# Patient Record
Sex: MALE | Race: WHITE
[De-identification: names, ages, dates, MRNs, and addresses within clinical notes are randomized per-mention and may not be internally consistent; named-entity substitution may affect disease eponyms.]

---

## 2018-07-09 ENCOUNTER — HOSPITAL ENCOUNTER (OUTPATIENT)
Dept: HOSPITAL 92 - LABBT | Age: 68
Discharge: HOME | End: 2018-07-09
Attending: NEUROLOGICAL SURGERY
Payer: MEDICARE

## 2018-07-09 DIAGNOSIS — Z01.818: Primary | ICD-10-CM

## 2018-07-09 DIAGNOSIS — M54.12: ICD-10-CM

## 2018-07-09 LAB
ANION GAP SERPL CALC-SCNC: 15 MMOL/L (ref 10–20)
BUN SERPL-MCNC: 10 MG/DL (ref 8.4–25.7)
CALCIUM SERPL-MCNC: 9.7 MG/DL (ref 7.8–10.44)
CHLORIDE SERPL-SCNC: 103 MMOL/L (ref 98–107)
CO2 SERPL-SCNC: 27 MMOL/L (ref 23–31)
CREAT CL PREDICTED SERPL C-G-VRATE: 0 ML/MIN (ref 70–130)
GLUCOSE SERPL-MCNC: 96 MG/DL (ref 80–115)
HGB BLD-MCNC: 13.9 G/DL (ref 14–18)
MCH RBC QN AUTO: 34.1 PG (ref 27–31)
MCV RBC AUTO: 99 FL (ref 78–98)
PLATELET # BLD AUTO: 129 THOU/UL (ref 130–400)
POTASSIUM SERPL-SCNC: 4.2 MMOL/L (ref 3.5–5.1)
RBC # BLD AUTO: 4.08 MILL/UL (ref 4.7–6.1)
SODIUM SERPL-SCNC: 141 MMOL/L (ref 136–145)
WBC # BLD AUTO: 5.5 THOU/UL (ref 4.8–10.8)

## 2018-07-09 PROCEDURE — 80048 BASIC METABOLIC PNL TOTAL CA: CPT

## 2018-07-09 PROCEDURE — 93010 ELECTROCARDIOGRAM REPORT: CPT

## 2018-07-09 PROCEDURE — 85027 COMPLETE CBC AUTOMATED: CPT

## 2018-07-09 PROCEDURE — 93005 ELECTROCARDIOGRAM TRACING: CPT

## 2018-07-10 NOTE — EKG
Test Reason : 

Blood Pressure : ***/*** mmHG

Vent. Rate : 069 BPM     Atrial Rate : 069 BPM

   P-R Int : 168 ms          QRS Dur : 082 ms

    QT Int : 396 ms       P-R-T Axes : 067 025 030 degrees

   QTc Int : 424 ms

 

Normal sinus rhythm

RSR' or QR pattern in V1 suggests right ventricular conduction delay

Borderline ECG

 

Confirmed by DUNCAN CAO ([id number]) on 7/10/2018 12:45:44 PM

 

Referred By:  ISABELLE           Confirmed By:DUNCAN CAO

## 2018-07-18 ENCOUNTER — HOSPITAL ENCOUNTER (INPATIENT)
Dept: HOSPITAL 92 - SDC | Age: 68
LOS: 1 days | Discharge: HOME | DRG: 473 | End: 2018-07-19
Attending: NEUROLOGICAL SURGERY | Admitting: NEUROLOGICAL SURGERY
Payer: MEDICARE

## 2018-07-18 VITALS — BODY MASS INDEX: 26.8 KG/M2

## 2018-07-18 DIAGNOSIS — M50.321: Primary | ICD-10-CM

## 2018-07-18 DIAGNOSIS — D53.9: ICD-10-CM

## 2018-07-18 DIAGNOSIS — Z79.899: ICD-10-CM

## 2018-07-18 DIAGNOSIS — I12.9: ICD-10-CM

## 2018-07-18 DIAGNOSIS — E78.5: ICD-10-CM

## 2018-07-18 DIAGNOSIS — M25.78: ICD-10-CM

## 2018-07-18 DIAGNOSIS — N18.2: ICD-10-CM

## 2018-07-18 PROCEDURE — 0RG20A0 FUSION OF 2 OR MORE CERVICAL VERTEBRAL JOINTS WITH INTERBODY FUSION DEVICE, ANTERIOR APPROACH, ANTERIOR COLUMN, OPEN APPROACH: ICD-10-PCS | Performed by: NEUROLOGICAL SURGERY

## 2018-07-18 PROCEDURE — C1713 ANCHOR/SCREW BN/BN,TIS/BN: HCPCS

## 2018-07-18 PROCEDURE — 76001: CPT

## 2018-07-18 PROCEDURE — 0RT30ZZ RESECTION OF CERVICAL VERTEBRAL DISC, OPEN APPROACH: ICD-10-PCS | Performed by: NEUROLOGICAL SURGERY

## 2018-07-18 PROCEDURE — C1776 JOINT DEVICE (IMPLANTABLE): HCPCS

## 2018-07-18 PROCEDURE — A4216 STERILE WATER/SALINE, 10 ML: HCPCS

## 2018-07-18 RX ADMIN — ACETAMINOPHEN AND CODEINE PHOSPHATE PRN TAB: 300; 30 TABLET ORAL at 12:36

## 2018-07-18 RX ADMIN — ACETAMINOPHEN AND CODEINE PHOSPHATE PRN TAB: 300; 30 TABLET ORAL at 19:13

## 2018-07-18 RX ADMIN — Medication SCH GM: at 14:43

## 2018-07-18 RX ADMIN — ACETAMINOPHEN AND CODEINE PHOSPHATE PRN TAB: 300; 30 TABLET ORAL at 22:02

## 2018-07-18 RX ADMIN — Medication SCH GM: at 20:01

## 2018-07-18 NOTE — OP
DATE OF PROCEDURE:  07/18/2018

 

SURGEON:  Manny Granger M.D.

 

ASSISTANT:  Douglas Jones PA-C

 

PROCEDURE:  Anterior cervical discectomy C4-5 and C5-6, interbody arthrodesis, intravertebral biomech
anical device, local morselized autograft, demineralized bone matrix, anterior titanium instrumentati
on C4-C6.

 

PROCEDURE IN DETAIL:  The patient was brought to the operating room and intubated.  He was positioned
 supine in modest extension on a gel-filled donut.  Incision was made in the right precervical area a
nd dissecting medial to the sternocleidomastoid muscle, identified the anterior cervical spine and ou
r level was confirmed by x-ray.  As anticipated, there was severe and dramatic osteophytes at C4-5 an
d C5-6.  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  We completely debrided and removed osteophytes at b
oth levels.  We placed distraction across C4 through C6 and C4-5 was somewhat mobile, but C5-6 was no
t.  Ultimately, because I could not identify any mobile or viable disk space at C5-6, any efforts at 
further discectomy was aborted here.  At C4-5 the disk was completely removed and the neural elements
 were decompressed.  The bony endplates were decorticated for the purpose of arthrodesis and appropri
ately sized intravertebral biomechanical PEEK device was brought into the field, filled with deminera
lized bone matrix, local morselized autograft, and tapped into place securely at C4-5.  Next, an ante
rior plate was brought in the field and secured to C4, C5, and C6 using two 14 mm screws at each leve
l.  The wound was then extensively irrigated, immaculate hemostasis was secured, and the wound was cl
osed in anatomic layers over a drain.

## 2018-07-19 VITALS — SYSTOLIC BLOOD PRESSURE: 143 MMHG | TEMPERATURE: 98 F | DIASTOLIC BLOOD PRESSURE: 86 MMHG

## 2018-07-19 RX ADMIN — ACETAMINOPHEN AND CODEINE PHOSPHATE PRN TAB: 300; 30 TABLET ORAL at 05:48

## 2018-07-19 RX ADMIN — Medication SCH GM: at 05:47

## 2018-07-19 RX ADMIN — ACETAMINOPHEN AND CODEINE PHOSPHATE PRN TAB: 300; 30 TABLET ORAL at 00:53

## 2018-07-19 RX ADMIN — ACETAMINOPHEN AND CODEINE PHOSPHATE PRN TAB: 300; 30 TABLET ORAL at 12:33

## 2018-08-01 ENCOUNTER — HOSPITAL ENCOUNTER (OUTPATIENT)
Dept: HOSPITAL 92 - TBSIIMAG | Age: 68
Discharge: HOME | End: 2018-08-01
Attending: NEUROLOGICAL SURGERY
Payer: MEDICARE

## 2018-08-01 DIAGNOSIS — M48.02: Primary | ICD-10-CM

## 2018-08-01 DIAGNOSIS — Z98.890: ICD-10-CM

## 2018-08-01 DIAGNOSIS — M47.892: ICD-10-CM

## 2018-08-01 PROCEDURE — 72040 X-RAY EXAM NECK SPINE 2-3 VW: CPT

## 2018-08-01 NOTE — RAD
CERVICAL SPINE RADIOGRAPH SERIES 3 VIEWS:

 

Date:  08/01/18 

 

CLINICAL HISTORY:  

Status post neck surgery, cervical stenosis of spine. 

 

FINDINGS:

ACDF hardware spans C4-C6. There is an intervening disc space prosthesis at C4-5. No hardware complic
ation is evident on the provided views. Multilevel moderate degenerative change present. Lateral mass
es of C1 appropriately align. 

 

IMPRESSION: 

Postoperative cervical spine with multilevel degenerative change. No obvious hardware complication. 

 

 

POS: [facility]

## 2018-09-20 ENCOUNTER — HOSPITAL ENCOUNTER (OUTPATIENT)
Dept: HOSPITAL 92 - TBSIIMAG | Age: 68
Discharge: HOME | End: 2018-09-20
Attending: NEUROLOGICAL SURGERY
Payer: MEDICARE

## 2018-09-20 DIAGNOSIS — M50.30: Primary | ICD-10-CM

## 2018-09-20 DIAGNOSIS — Z98.1: ICD-10-CM

## 2018-09-20 PROCEDURE — 72040 X-RAY EXAM NECK SPINE 2-3 VW: CPT

## 2018-09-20 NOTE — RAD
CERVICAL SPINE AP AND LATERAL STANDARD

9/20/18

 

HISTORY: 

Surgical followup.

 

COMPARISON:  

Radiograph 8/1/18.

 

FINDINGS:  

The ACDF hardware is present at C4-C6 with discectomy changes at C4-5. This is similar to the compari
son examination. No migration of the discectomy spacer. Open mouth odontoid view is normal. 

 

Moderate narrowing at C3-4 disc space as well as the C6-7 disc space. 

 

IMPRESSION:  

Unchanged satisfactory postoperative appearance. 

 

 

 

POS: [facility]

## 2020-03-31 ENCOUNTER — HOSPITAL ENCOUNTER (OUTPATIENT)
Dept: HOSPITAL 92 - BICRAD | Age: 70
Discharge: HOME | End: 2020-03-31
Attending: NEUROLOGICAL SURGERY
Payer: MEDICARE

## 2020-03-31 DIAGNOSIS — M47.812: ICD-10-CM

## 2020-03-31 DIAGNOSIS — M48.02: Primary | ICD-10-CM

## 2020-03-31 DIAGNOSIS — Z98.1: ICD-10-CM

## 2020-03-31 PROCEDURE — 72050 X-RAY EXAM NECK SPINE 4/5VWS: CPT

## 2020-03-31 NOTE — RAD
CERVICAL SPINE 4 VIEWS:

 

HISTORY: 

Cervical stenosis.  Neck pain.

 

FINDINGS: 

Previous anterior fusion procedures noted.  Anterior plate and screws transfix C4, C5, and C6.  Inter
body implant at C4-5 shows some anterior displacement.  There is loss of disk space at C5-6 and C6-7.
  There is a posterior listhesis at C4-5 and C5-6 with posterior spondylosis at these levels.  The po
sterior listhesis at these levels does not appear to significantly change with flexion or extension.

 

IMPRESSION: 

Degenerative and postoperative changes of cervical spine.

 

POS: [facility]

## 2020-04-25 ENCOUNTER — HOSPITAL ENCOUNTER (EMERGENCY)
Dept: HOSPITAL 92 - ERS | Age: 70
Discharge: HOME | End: 2020-04-25
Payer: MEDICARE

## 2020-04-25 DIAGNOSIS — I10: ICD-10-CM

## 2020-04-25 DIAGNOSIS — T63.481A: Primary | ICD-10-CM

## 2020-04-25 DIAGNOSIS — E78.5: ICD-10-CM

## 2020-04-25 DIAGNOSIS — E78.00: ICD-10-CM

## 2020-04-25 DIAGNOSIS — Z79.899: ICD-10-CM

## 2020-04-25 DIAGNOSIS — Z79.82: ICD-10-CM

## 2020-04-25 PROCEDURE — 99282 EMERGENCY DEPT VISIT SF MDM: CPT

## 2021-01-27 ENCOUNTER — HOSPITAL ENCOUNTER (EMERGENCY)
Dept: HOSPITAL 92 - ERS | Age: 71
Discharge: HOME | End: 2021-01-27
Payer: MEDICARE

## 2021-01-27 DIAGNOSIS — J12.82: ICD-10-CM

## 2021-01-27 DIAGNOSIS — U07.1: Primary | ICD-10-CM

## 2021-01-27 DIAGNOSIS — E78.00: ICD-10-CM

## 2021-01-27 DIAGNOSIS — E78.5: ICD-10-CM

## 2021-01-27 DIAGNOSIS — Z79.899: ICD-10-CM

## 2021-01-27 DIAGNOSIS — I10: ICD-10-CM

## 2021-01-27 DIAGNOSIS — Z79.82: ICD-10-CM

## 2021-01-27 LAB
ALBUMIN SERPL BCG-MCNC: 4.7 G/DL (ref 3.4–4.8)
ALP SERPL-CCNC: 114 U/L (ref 40–110)
ALT SERPL W P-5'-P-CCNC: 35 U/L (ref 8–55)
ANION GAP SERPL CALC-SCNC: 18 MMOL/L (ref 10–20)
AST SERPL-CCNC: 34 U/L (ref 5–34)
BASOPHILS # BLD AUTO: 0 THOU/UL (ref 0–0.2)
BASOPHILS NFR BLD AUTO: 0.2 % (ref 0–1)
BILIRUB SERPL-MCNC: 1.2 MG/DL (ref 0.2–1.2)
BUN SERPL-MCNC: 17 MG/DL (ref 8.4–25.7)
CALCIUM SERPL-MCNC: 9.1 MG/DL (ref 7.8–10.44)
CHLORIDE SERPL-SCNC: 99 MMOL/L (ref 98–107)
CO2 SERPL-SCNC: 25 MMOL/L (ref 23–31)
CREAT CL PREDICTED SERPL C-G-VRATE: 0 ML/MIN (ref 70–130)
EOSINOPHIL # BLD AUTO: 0 THOU/UL (ref 0–0.7)
EOSINOPHIL NFR BLD AUTO: 0.2 % (ref 0–10)
GLOBULIN SER CALC-MCNC: 3.2 G/DL (ref 2.4–3.5)
GLUCOSE SERPL-MCNC: 109 MG/DL (ref 80–115)
HGB BLD-MCNC: 15.6 G/DL (ref 14–18)
LYMPHOCYTES # BLD: 0.9 THOU/UL (ref 1.2–3.4)
LYMPHOCYTES NFR BLD AUTO: 12.8 % (ref 21–51)
MCH RBC QN AUTO: 30.7 PG (ref 27–31)
MCV RBC AUTO: 93 FL (ref 78–98)
MONOCYTES # BLD AUTO: 0.6 THOU/UL (ref 0.11–0.59)
MONOCYTES NFR BLD AUTO: 8.6 % (ref 0–10)
NEUTROPHILS # BLD AUTO: 5.6 THOU/UL (ref 1.4–6.5)
NEUTROPHILS NFR BLD AUTO: 78.1 % (ref 42–75)
PLATELET # BLD AUTO: 168 THOU/UL (ref 130–400)
POTASSIUM SERPL-SCNC: 3.7 MMOL/L (ref 3.5–5.1)
RBC # BLD AUTO: 5.07 MILL/UL (ref 4.7–6.1)
SARS-COV-2 RNA RESP QL NAA+PROBE: DETECTED
SODIUM SERPL-SCNC: 138 MMOL/L (ref 136–145)
WBC # BLD AUTO: 7.2 THOU/UL (ref 4.8–10.8)

## 2021-01-27 PROCEDURE — 71275 CT ANGIOGRAPHY CHEST: CPT

## 2021-01-27 PROCEDURE — 36415 COLL VENOUS BLD VENIPUNCTURE: CPT

## 2021-01-27 PROCEDURE — 93005 ELECTROCARDIOGRAM TRACING: CPT

## 2021-01-27 PROCEDURE — 85025 COMPLETE CBC W/AUTO DIFF WBC: CPT

## 2021-01-27 PROCEDURE — 71046 X-RAY EXAM CHEST 2 VIEWS: CPT

## 2021-01-27 PROCEDURE — 99285 EMERGENCY DEPT VISIT HI MDM: CPT

## 2021-01-27 PROCEDURE — 80053 COMPREHEN METABOLIC PANEL: CPT

## 2021-01-27 PROCEDURE — 96374 THER/PROPH/DIAG INJ IV PUSH: CPT

## 2021-01-27 PROCEDURE — 0240U: CPT

## 2021-01-27 PROCEDURE — 84484 ASSAY OF TROPONIN QUANT: CPT

## 2021-08-24 ENCOUNTER — HOSPITAL ENCOUNTER (OUTPATIENT)
Dept: HOSPITAL 92 - BICRAD | Age: 71
Discharge: HOME | End: 2021-08-24
Attending: INTERNAL MEDICINE
Payer: MEDICARE

## 2021-08-24 DIAGNOSIS — R06.00: Primary | ICD-10-CM

## 2021-08-24 PROCEDURE — 71046 X-RAY EXAM CHEST 2 VIEWS: CPT

## 2022-08-01 ENCOUNTER — HOSPITAL ENCOUNTER (OUTPATIENT)
Dept: HOSPITAL 92 - RAD | Age: 72
Discharge: HOME | End: 2022-08-01
Attending: OTOLARYNGOLOGY
Payer: MEDICARE

## 2022-08-01 DIAGNOSIS — R13.12: Primary | ICD-10-CM

## 2022-08-01 DIAGNOSIS — R63.30: ICD-10-CM

## 2022-08-01 PROCEDURE — 74230 X-RAY XM SWLNG FUNCJ C+: CPT

## 2022-08-30 ENCOUNTER — HOSPITAL ENCOUNTER (OUTPATIENT)
Dept: HOSPITAL 92 - SLEEPLAB | Age: 72
Discharge: HOME | End: 2022-08-30
Payer: MEDICARE

## 2022-08-30 DIAGNOSIS — I25.10: ICD-10-CM

## 2022-08-30 DIAGNOSIS — G47.00: ICD-10-CM

## 2022-08-30 DIAGNOSIS — R13.10: ICD-10-CM

## 2022-08-30 DIAGNOSIS — E66.9: ICD-10-CM

## 2022-08-30 DIAGNOSIS — R06.83: ICD-10-CM

## 2022-08-30 DIAGNOSIS — G47.33: Primary | ICD-10-CM

## 2022-08-30 DIAGNOSIS — R53.83: ICD-10-CM

## 2022-08-30 DIAGNOSIS — G47.10: ICD-10-CM

## 2022-08-30 DIAGNOSIS — I10: ICD-10-CM

## 2022-08-30 PROCEDURE — 95810 POLYSOM 6/> YRS 4/> PARAM: CPT

## 2022-11-17 ENCOUNTER — HOSPITAL ENCOUNTER (OUTPATIENT)
Dept: HOSPITAL 92 - CSHMRI | Age: 72
Discharge: HOME | End: 2022-11-17
Attending: UROLOGY
Payer: COMMERCIAL

## 2022-11-17 DIAGNOSIS — M87.9: ICD-10-CM

## 2022-11-17 DIAGNOSIS — C61: Primary | ICD-10-CM

## 2022-11-17 LAB — EGFRCR SERPLBLD CKD-EPI 2021: 92 ML/MIN/{1.73_M2}

## 2022-11-17 PROCEDURE — 82565 ASSAY OF CREATININE: CPT

## 2022-11-17 PROCEDURE — 72197 MRI PELVIS W/O & W/DYE: CPT

## 2022-12-08 ENCOUNTER — HOSPITAL ENCOUNTER (OUTPATIENT)
Dept: HOSPITAL 92 - RAD | Age: 72
Discharge: HOME | End: 2022-12-08
Attending: INTERNAL MEDICINE
Payer: COMMERCIAL

## 2022-12-08 DIAGNOSIS — R06.00: Primary | ICD-10-CM

## 2022-12-08 PROCEDURE — 71046 X-RAY EXAM CHEST 2 VIEWS: CPT

## 2023-08-28 ENCOUNTER — HOSPITAL ENCOUNTER (OUTPATIENT)
Dept: HOSPITAL 92 - CT | Age: 73
Discharge: HOME | End: 2023-08-28
Attending: FAMILY MEDICINE
Payer: COMMERCIAL

## 2023-08-28 DIAGNOSIS — R10.84: Primary | ICD-10-CM

## 2023-08-28 LAB — EGFRCR SERPLBLD CKD-EPI 2021: 80 ML/MIN/{1.73_M2}

## 2023-08-28 PROCEDURE — 74177 CT ABD & PELVIS W/CONTRAST: CPT

## 2023-08-28 PROCEDURE — 82565 ASSAY OF CREATININE: CPT

## 2023-10-04 ENCOUNTER — HOSPITAL ENCOUNTER (OUTPATIENT)
Dept: HOSPITAL 92 - SDC | Age: 73
Discharge: HOME | End: 2023-10-04
Attending: SURGERY
Payer: COMMERCIAL

## 2023-10-04 VITALS — BODY MASS INDEX: 28.4 KG/M2

## 2023-10-04 DIAGNOSIS — K42.9: Primary | ICD-10-CM

## 2023-10-04 DIAGNOSIS — I25.10: ICD-10-CM

## 2023-10-04 DIAGNOSIS — I10: ICD-10-CM

## 2023-10-04 DIAGNOSIS — Z79.899: ICD-10-CM

## 2023-10-04 DIAGNOSIS — E78.5: ICD-10-CM

## 2023-10-04 PROCEDURE — C1781 MESH (IMPLANTABLE): HCPCS

## 2023-10-04 PROCEDURE — 0WUF4JZ SUPPLEMENT ABDOMINAL WALL WITH SYNTHETIC SUBSTITUTE, PERCUTANEOUS ENDOSCOPIC APPROACH: ICD-10-PCS | Performed by: SURGERY

## 2023-10-04 PROCEDURE — S0020 INJECTION, BUPIVICAINE HYDRO: HCPCS
